# Patient Record
Sex: MALE | Race: BLACK OR AFRICAN AMERICAN
[De-identification: names, ages, dates, MRNs, and addresses within clinical notes are randomized per-mention and may not be internally consistent; named-entity substitution may affect disease eponyms.]

---

## 2017-01-18 ENCOUNTER — HOSPITAL ENCOUNTER (EMERGENCY)
Dept: HOSPITAL 35 - ER | Age: 57
Discharge: HOME | End: 2017-01-18
Payer: COMMERCIAL

## 2017-01-18 VITALS — SYSTOLIC BLOOD PRESSURE: 132 MMHG | DIASTOLIC BLOOD PRESSURE: 92 MMHG

## 2017-01-18 VITALS — HEIGHT: 74 IN | BODY MASS INDEX: 31.44 KG/M2 | WEIGHT: 245 LBS

## 2017-01-18 DIAGNOSIS — M16.11: Primary | ICD-10-CM

## 2017-01-18 DIAGNOSIS — M85.68: ICD-10-CM

## 2017-01-18 DIAGNOSIS — G44.209: ICD-10-CM

## 2020-11-09 NOTE — EKG
[facility]
Tasha Son[place]   [address]                     ELECTROCARDIOGRAM REPORT      
_______________________________________________________________________________
 
Name:       KAZ WATTERS                  Room #:                     [facility]..#:      [id number]                       Account #:      [id number]  
Admission:  20    Attend Phys:                          
Discharge:              Date of Birth:  [birth year]  
                                                          Report #: [id number]
                                                                    [id number]
_______________________________________________________________________________
THIS REPORT FOR:  
 
cc:  SAGAR - Ana family physician/PCP 
     SAGAR - Ana family physician/PCP 
     Karlo Finn MD [facility]                                        ~
THIS REPORT FOR:   //name//                          
 
                         [facility] ED
                                       
Test Date:    2020               Test Time:    06:09:21
Pat Name:     KAZ WATTERS             Department:   
Patient ID:   [id number]            Room:          
Gender:                               Technician:   
:          1960               Requested By: Naun Alvarez
Order Number: [id number] MD:   Kralo Finn
                                 Measurements
Intervals                              Axis          
Rate:         80                       P:            25
VT:           222                      QRS:          -17
QRSD:         91                       T:            6
QT:           411                                    
QTc:          475                                    
                           Interpretive Statements
Sinus rhythm
Prolonged VT interval
Abnormal R-wave progression, early transition
Left ventricular hypertrophy
No previous ECG available for comparison
Electronically Signed On 2020 7:50:27 CST by Karlo Finn
[email or web address]
 
 
 
 
 
 
 
 
 
 
 
 
 
 
  <ELECTRONICALLY SIGNED>
   By: Karlo Finn MD, FAC   
  20     0750
D: 20                           _____________________________________
T: 20                           Karlo Finn MD, [facility]     /EPI

## 2020-12-09 NOTE — EKG
[facility]
[place]   [address]                     ELECTROCARDIOGRAM REPORT      
_______________________________________________________________________________
 
Name:       NICOLE WATTERSIN                  Room #:                     REG [facility].#:      [id number]                       Account #:      [id number]  
Admission:  20    Attend Phys:                          
Discharge:              Date of Birth:  [birth year]  
                                                          Report #: [id number]
                                                                    [id number]
_______________________________________________________________________________
THIS REPORT FOR:  
 
cc:  SAGAR Perez family physician/PCP 
     SAGAR Perez family physician/PCP 
     Fabián Holley MD [facility]                                         ~
THIS REPORT FOR:   //name//                          
 
                         [facility] ED
                                       
Test Date:    2020               Test Time:    15:02:27
Pat Name:     KAZ WATTERS             Department:   
Patient ID:   [id number]            Room:          
Gender:                               Technician:   
:          1960               Requested By: Kyle Bruce
Order Number: [id number] MD:   Fabián Holley
                                 Measurements
Intervals                              Axis          
Rate:         87                       P:            3
OK:           204                      QRS:          -18
QRSD:         84                       T:            15
QT:           371                                    
QTc:          447                                    
                           Interpretive Statements
Sinus rhythm
Borderline prolonged OK interval
Left ventricular hypertrophy
Compared to ECG 2020 06:09:21
No significant changes
Electronically Signed On 2020 16:06:23 CST by Fabián Holley
[email or web address]
 
 
 
 
 
 
 
 
 
 
 
 
 
 
  <ELECTRONICALLY SIGNED>
   By: Fabián Holley MD, FACC    
  20     1606
D: 20 1502                           _____________________________________
T: 20 150                           Fabián Holley MD, [facility]      /EPI

## 2021-05-03 NOTE — EKG
[facility]
[place]  [address]
Phone:  [phone]                    ELECTROCARDIOGRAM REPORT      
_______________________________________________________________________________
 
Name:       KAZ WATTERS                  Room #:                     DEP   
GRICEL#:      [id number]     Account #:      [id number]  
Admission:  21    Attend Phys:                          
Discharge:  21    Date of Birth:  [birth year]  
                                                          Report #: [id number]
   [id number]
_______________________________________________________________________________
                         [facility] ED
                                       
Test Date:    2021               Test Time:    12:20:27
Pat Name:     KAZ WATTERS             Department:   
Patient ID:   [id number]            Room:          
Gender:       M                        Technician:   MARIANELA
:          1960               Requested By: Tom Cook
Order Number: [id number] MD:   Fabián Holley
                                 Measurements
Intervals                              Axis          
Rate:         89                       P:            5
AK:           239                      QRS:          -12
QRSD:         91                       T:            16
QT:           396                                    
QTc:          482                                    
                           Interpretive Statements
Sinus rhythm
Prolonged AK interval
Left atrial enlargement
Left ventricular hypertrophy
Borderline prolonged QT interval
Baseline wander in lead(s) V1
Compared to ECG 2020 15:02:27
Atrial abnormality now present
Electronically Signed On 5-3-2021 7:06:21 CDT by Fabián Holley
[email or web address]
 
 
 
 
 
 
 
 
 
 
 
 
 
 
 
 
 
  <ELECTRONICALLY SIGNED>
   By: Fabián Holley MD, [facility]    
  21     0706
D: 21 1220                           _____________________________________
T: 21 1220                           Fabián Holley MD, [facility]      /EPI